# Patient Record
Sex: MALE | Race: WHITE | URBAN - METROPOLITAN AREA
[De-identification: names, ages, dates, MRNs, and addresses within clinical notes are randomized per-mention and may not be internally consistent; named-entity substitution may affect disease eponyms.]

---

## 2018-01-18 ENCOUNTER — HOSPITAL ENCOUNTER (EMERGENCY)
Facility: CLINIC | Age: 38
Discharge: HOME | End: 2018-01-18
Attending: EMERGENCY MEDICINE | Admitting: EMERGENCY MEDICINE
Payer: COMMERCIAL

## 2018-01-18 VITALS
OXYGEN SATURATION: 97 % | BODY MASS INDEX: 27.87 KG/M2 | RESPIRATION RATE: 18 BRPM | DIASTOLIC BLOOD PRESSURE: 88 MMHG | WEIGHT: 220 LBS | SYSTOLIC BLOOD PRESSURE: 147 MMHG

## 2018-01-18 DIAGNOSIS — M54.50 ACUTE MIDLINE LOW BACK PAIN WITHOUT SCIATICA: ICD-10-CM
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
## 2018-01-18 DIAGNOSIS — S16.1XXA STRAIN OF NECK MUSCLE, INITIAL ENCOUNTER: ICD-10-CM

## 2018-01-18 PROCEDURE — 99284 EMERGENCY DEPT VISIT MOD MDM: CPT | Mod: Z6 | Performed by: EMERGENCY MEDICINE

## 2018-01-18 PROCEDURE — 99282 EMERGENCY DEPT VISIT SF MDM: CPT | Performed by: EMERGENCY MEDICINE

## 2018-01-18 RX ORDER — CYCLOBENZAPRINE HCL 5 MG
5 TABLET ORAL 3 TIMES DAILY PRN
Qty: 7 TABLET | Refills: 0 | Status: SHIPPED | OUTPATIENT
Start: 2018-01-18 | End: 2018-01-26

## 2018-01-18 ASSESSMENT — ENCOUNTER SYMPTOMS
ENDOCRINE NEGATIVE: 1
PSYCHIATRIC NEGATIVE: 1
NECK STIFFNESS: 1
GASTROINTESTINAL NEGATIVE: 1
BACK PAIN: 1
ALLERGIC/IMMUNOLOGIC NEGATIVE: 1
EYES NEGATIVE: 1
NEUROLOGICAL NEGATIVE: 1
RESPIRATORY NEGATIVE: 1
NECK PAIN: 1
CONSTITUTIONAL NEGATIVE: 1
CARDIOVASCULAR NEGATIVE: 1
HEMATOLOGIC/LYMPHATIC NEGATIVE: 1

## 2018-01-18 NOTE — ED AVS SNAPSHOT
[facility] Emergency Department    [address] [facility] [facility] [address]    Phone:  [phone]    Fax:  [phone]                                       Troy Cooper   MRN: [id number]    Department:  [facility] Emergency Department   Date of Visit:  1/18/2018           After Visit Summary Signature Page     I have received my discharge instructions, and my questions have been answered. I have discussed any challenges I see with this plan with the nurse or doctor.    ..........................................................................................................................................  Patient/Patient Representative Signature      ..........................................................................................................................................  Patient Representative Print Name and Relationship to Patient    ..................................................               ................................................  Date                                            Time    ..........................................................................................................................................  Reviewed by Signature/Title    ...................................................              ..............................................  Date                                                            Time
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
## 2018-01-26 ENCOUNTER — OFFICE VISIT (OUTPATIENT)
Dept: FAMILY MEDICINE | Facility: CLINIC | Age: 38
End: 2018-01-26
Payer: COMMERCIAL

## 2018-01-26 VITALS
DIASTOLIC BLOOD PRESSURE: 72 MMHG | WEIGHT: 227 LBS | SYSTOLIC BLOOD PRESSURE: 122 MMHG | BODY MASS INDEX: 28.23 KG/M2 | RESPIRATION RATE: 18 BRPM | HEIGHT: 75 IN | HEART RATE: 80 BPM | TEMPERATURE: 98.1 F

## 2018-01-26 DIAGNOSIS — Z82.49 FAMILY HISTORY OF PREMATURE CORONARY HEART DISEASE: ICD-10-CM

## 2018-01-26 DIAGNOSIS — Z00.01 ENCOUNTER FOR ROUTINE ADULT HEALTH EXAMINATION WITH ABNORMAL FINDINGS: Primary | ICD-10-CM

## 2018-01-26 DIAGNOSIS — R07.89 ATYPICAL CHEST PAIN: ICD-10-CM

## 2018-01-26 PROCEDURE — 93000 ELECTROCARDIOGRAM COMPLETE: CPT | Performed by: NURSE PRACTITIONER

## 2018-01-26 PROCEDURE — 99385 PREV VISIT NEW AGE 18-39: CPT | Performed by: NURSE PRACTITIONER

## 2018-01-26 PROCEDURE — 99213 OFFICE O/P EST LOW 20 MIN: CPT | Mod: 25 | Performed by: NURSE PRACTITIONER

## 2018-01-26 ASSESSMENT — PAIN SCALES - GENERAL: PAINLEVEL: NO PAIN (0)

## 2018-01-26 NOTE — PATIENT INSTRUCTIONS
Schedule lab appointment for next week fasting for lab work - will update you on those results    Schedule Cardiolite stress test and I will update you on results     If having chest pain that doesn't go away in the normal time go into ER     See me back as determined with results

## 2018-01-26 NOTE — NURSING NOTE
"Chief Complaint   Patient presents with     Physical       Initial /72 (BP Location: Right arm, Patient Position: Chair, Cuff Size: Adult Large)  Pulse 80  Temp 98.1  F (36.7  C) (Tympanic)  Resp 18  Ht 6' 2.5\" (1.892 m)  Wt 227 lb (103 kg)  BMI 28.76 kg/m2 Estimated body mass index is 28.76 kg/(m^2) as calculated from the following:    Height as of this encounter: 6' 2.5\" (1.892 m).    Weight as of this encounter: 227 lb (103 kg).  Medication Reconciliation: complete    Health Maintenance that is potentially due pending provider review:  NONE    Mia Butts MA  1:49 PM 1/26/2018  .        "

## 2018-01-26 NOTE — MR AVS SNAPSHOT
After Visit Summary   1/26/2018    Troy Cooper    MRN: [id number]           Patient Information     Date Of Birth          1980        Visit Information        Provider Department      1/26/2018 1:40 PM Caity Marsh APRN CNP [facility]        Today's Diagnoses     Atypical chest pain    -  1    Encounter for routine adult health examination with abnormal findings          Care Instructions    Schedule lab appointment for next week fasting for lab work - will update you on those results    Schedule Cardiolite stress test and I will update you on results     If having chest pain that doesn't go away in the normal time go into ER     See me back as determined with results           Follow-ups after your visit        Future tests that were ordered for you today     Open Future Orders        Priority Expected Expires Ordered    Lipid panel reflex to direct LDL Fasting Routine  1/26/2019 1/26/2018    Comprehensive metabolic panel (BMP + Alb, Alk Phos, ALT, AST, Total. Bili, TP) Routine  1/26/2019 1/26/2018    CBC with platelets Routine  1/26/2019 1/26/2018    NM Exercise stress test Routine  1/26/2019 1/26/2018    TSH Routine  1/26/2019 1/26/2018            Who to contact     If you have questions or need follow up information about today's clinic visit or your schedule please contact [facility] directly at [phone].  Normal or non-critical lab and imaging results will be communicated to you by MyChart, letter or phone within 4 business days after the clinic has received the results. If you do not hear from us within 7 days, please contact the clinic through MyChart or phone. If you have a critical or abnormal lab result, we will notify you by phone as soon as possible.  Submit refill requests through [employer] or call your pharmacy and they will forward the refill request to us. Please allow 3 business days for your refill to be completed.           "Additional Information About Your Visit        MyChart Information     [employer] lets you send messages to your doctor, view your test results, renew your prescriptions, schedule appointments and more. To sign up, go to [email or web address] . Click on \"Log in\" on the left side of the screen, which will take you to the Welcome page. Then click on \"Sign up Now\" on the right side of the page.     You will be asked to enter the access code listed below, as well as some personal information. Please follow the directions to create your username and password.     Your access code is: [email or web address]  Expires: 2018  6:37 AM     Your access code will  in 90 days. If you need help or a new code, please call your [facility] clinic or [phone].        Care EveryWhere ID     This is your [facility] EveryWhere ID. This could be used by other organizations to access your [facility] medical records  [id number]        Your Vitals Were     Pulse Temperature Respirations Height BMI (Body Mass Index)       80 98.1  F (36.7  C) (Tympanic) 18 6' 2.5\" (1.892 m) 28.76 kg/m2        Blood Pressure from Last 3 Encounters:   18 122/72   18 147/88   08/07/15 129/82    Weight from Last 3 Encounters:   18 227 lb (103 kg)   18 220 lb (99.8 kg)   12 231 lb 6.4 oz (105 kg)              We Performed the Following     EKG 12-lead complete w/read - Clinics        Primary Care Provider Office Phone # Fax #    MIRA Chang [phone]       [address] [address]        Equal Access to Services     ALICIA INIGUEZ : Ruben Fleming, ofelia joel, hari marie. So [facility] [phone].    ATENCIÓN: Si habla español, tiene a ji disposición servicios gratuitos de asistencia lingüística. Llame al [phone].    We comply with applicable federal civil rights laws and [place] laws. We " do not discriminate on the basis of race, color, national origin, age, disability, sex, sexual orientation, or gender identity.            Thank you!     Thank you for choosing [facility]  for your care. Our goal is always to provide you with excellent care. Hearing back from our patients is one way we can continue to improve our services. Please take a few minutes to complete the written survey that you may receive in the mail after your visit with us. Thank you!             Your Updated Medication List - Protect others around you: Learn how to safely use, store and throw away your medicines at www.disposemymeds.org.      Notice  As of 1/26/2018  2:50 PM    You have not been prescribed any medications.

## 2018-01-26 NOTE — PROGRESS NOTES
SUBJECTIVE:   CC: Troy Cooper is an 37 year old male who presents for preventative health visit.     Physical   Annual:     Getting at least 3 servings of Calcium per day::  Yes    Bi-annual eye exam::  NO    Dental care twice a year::  Yes    Sleep apnea or symptoms of sleep apnea::  None    Diet::  Regular (no restrictions)    Frequency of exercise::  None    Taking medications regularly::  Yes    Medication side effects::  None    Additional concerns today::  YES          1.) Chest Pain, has happed 3 times. Past episode was Tuesday morning. Sharp pain in chest lasting around 5 min. Was bent over working on the floor and hit him sharp, pain would get worse with a deep breath, was going to go in, but went away - didn't have shortness of breath particularly. Not associated with any other symptoms aside from not being able to take a deep breath due to the pain, no shortness of breath, dizziness or lightheadedness  First 2 episodes happened within the last 3 months - came home from work lying down on back, not exerting self, again sharp, no associated symptoms and went away in 5 minutes  Significantly stressed over the last 3 months     No other symptoms with the episode, no radiation of pain, denies any swelling or edema  Cannot determine any other precipitators    Has passed out 2 times in the last 3 months randomly as well  First time was when dad was diagnosed with cancer in office when they got the news of diagnosis  A week later got up in middle of night go bathroom, and just blacked out, awoke by wife. Patient has not been taking care of himself in the last few months, not drinking or eating well. Is also an everyday 1 ppd smoker    Has a family history of early heart disease, paternal grandfather passed at 51 of MI   Has been more anxious and stressful with dad and work - working 24-36 hours shifts with various start times       Today's PHQ-2 Score:   PHQ-2 ( 1999 [employer]) 1/26/2018   Q1: Little interest  or pleasure in doing things 0   Q2: Feeling down, depressed or hopeless 0   PHQ-2 Score 0   Q1: Little interest or pleasure in doing things Not at all   Q2: Feeling down, depressed or hopeless Not at all   PHQ-2 Score 0       Abuse: Current or Past(Physical, Sexual or Emotional)- No  Do you feel safe in your environment - Yes    Social History   Substance Use Topics     Smoking status: Current Every Day Smoker     Packs/day: 1.00     Years: 12.00     Types: Cigarettes     Smokeless tobacco: Never Used      Comment: 1 pack per day     Alcohol use Yes      Comment: occ     Alcohol Use 1/26/2018   If you drink alcohol, do you typically have greater than 3 drinks per day OR greater than 7 drinks per week?   No   Last PSA: No results found for: PSA    Reviewed orders with patient. Reviewed health maintenance and updated orders accordingly - Yes  Labs reviewed in EPIC  BP Readings from Last 3 Encounters:   01/26/18 122/72   01/18/18 147/88   08/07/15 129/82    Wt Readings from Last 3 Encounters:   01/26/18 227 lb (103 kg)   01/18/18 220 lb (99.8 kg)   04/18/12 231 lb 6.4 oz (105 kg)                  Patient Active Problem List   Diagnosis     Tobacco abuse     CARDIOVASCULAR SCREENING; LDL GOAL LESS THAN 160     Past Surgical History:   Procedure Laterality Date     SURGICAL HISTORY OF -   2005    back surgery at [facility]       Social History   Substance Use Topics     Smoking status: Current Every Day Smoker     Packs/day: 1.00     Years: 12.00     Types: Cigarettes     Smokeless tobacco: Never Used      Comment: 1 pack per day     Alcohol use Yes      Comment: occ     Family History   Problem Relation Age of Onset     Hypertension Mother      Other Cancer Father 66     Liver cancer     Cardiovascular Paternal Grandmother      heart attack     Coronary Artery Disease Paternal Grandmother 53         No current outpatient prescriptions on file.     No Known Allergies    Reviewed and updated as needed this visit  "by clinical staff  Tobacco  Allergies  Meds  Problems  Med Hx  Surg Hx  Fam Hx  Soc Hx          Reviewed and updated as needed this visit by Provider  Tobacco  Allergies  Meds  Problems  Med Hx  Surg Hx  Fam Hx  Soc Hx         History reviewed. No pertinent past medical history.   Past Surgical History:   Procedure Laterality Date     SURGICAL HISTORY OF -   2005    back surgery at [facility]       Review of Systems  C: NEGATIVE for fever, chills, change in weight  I: NEGATIVE for worrisome rashes, moles or lesions  E: NEGATIVE for vision changes or irritation  ENT: NEGATIVE for ear, mouth and throat problems  R: NEGATIVE for significant cough or SOB  CV: POSITIVE for chest pain and family history of early CHD  GI: NEGATIVE for nausea, abdominal pain, heartburn, or change in bowel habits   male: negative for dysuria, hematuria, decreased urinary stream, erectile dysfunction, urethral discharge  M: NEGATIVE for significant arthralgias or myalgia  N: NEGATIVE for weakness, dizziness or paresthesias  P: NEGATIVE for changes in mood or affect    OBJECTIVE:   /72 (BP Location: Right arm, Patient Position: Chair, Cuff Size: Adult Large)  Pulse 80  Temp 98.1  F (36.7  C) (Tympanic)  Resp 18  Ht 6' 2.5\" (1.892 m)  Wt 227 lb (103 kg)  BMI 28.76 kg/m2    Physical Exam  GENERAL: healthy, alert and no distress  EYES: Eyes grossly normal to inspection, PERRL and conjunctivae and sclerae normal  HENT: ear canals and TM's normal, nose and mouth without ulcers or lesions  NECK: no adenopathy, no asymmetry, masses, or scars and thyroid normal to palpation  RESP: lungs clear to auscultation - no rales, rhonchi or wheezes  CV: regular rate and rhythm, normal S1 S2, no S3 or S4, no murmur, click or rub, no peripheral edema and peripheral pulses strong  ABDOMEN: soft, nontender, no hepatosplenomegaly, no masses and bowel sounds normal   (male): normal male genitalia without lesions or urethral " discharge, no hernia  MS: no gross musculoskeletal defects noted, no edema  SKIN: no suspicious lesions or rashes  NEURO: Normal strength and tone, mentation intact and speech normal  PSYCH: mentation appears normal, affect normal/bright - slightly anxious     ASSESSMENT/PLAN:   1. Encounter for routine adult health examination with abnormal findings    - Comprehensive metabolic panel (BMP + Alb, Alk Phos, ALT, AST, Total. Bili, TP); Future  - CBC with platelets; Future    2. Atypical chest pain  Patient has had 3 episodes of random sharp left sided chest pain over the last 3 months with no other associated symptoms. No precipitators identified by patient. Has a family history of early CHD. Patient also has a significant amount of stress over the last 3 months and dad recently diagnosed with liver cancer, this could be caused by the increased amount of stress and poor rest, fluid intake and proper eating. EKG normal, will get labs to rule out underlying cause and order a stress test given family history.     - EKG 12-lead complete w/read - Clinics  - Lipid panel reflex to direct LDL Fasting; Future  - Comprehensive metabolic panel (BMP + Alb, Alk Phos, ALT, AST, Total. Bili, TP); Future  - CBC with platelets; Future  - TSH; Future  - NM Lexiscan stress test; Future    3. Family history of premature coronary heart disease    - NM Lexiscan stress test; Future    COUNSELING:   Reviewed preventive health counseling, as reflected in patient instructions       Regular exercise       Healthy diet/nutrition         reports that he has been smoking Cigarettes.  He has a 12.00 pack-year smoking history. He has never used smokeless tobacco.  Tobacco Cessation Action Plan: Discussed this at length with patient, has tried Chantix in the past which didn't help. Is highly considering quitting, just not sure how he wants to go about doing this. Would like to think about this.   Estimated body mass index is 28.76 kg/(m^2) as  "calculated from the following:    Height as of this encounter: 6' 2.5\" (1.892 m).    Weight as of this encounter: 227 lb (103 kg).   Weight management plan: Discussed healthy diet and exercise guidelines and patient will follow up in 12 months in clinic to re-evaluate.    Counseling Resources:  ATP IV Guidelines  Pooled Cohorts Equation Calculator  FRAX Risk Assessment  ICSI Preventive Guidelines  Dietary Guidelines for Americans, 2010  USDA's MyPlate  ASA Prophylaxis  Lung CA Screening    MIRA Simon [facility]  "

## 2018-01-30 DIAGNOSIS — Z00.01 ENCOUNTER FOR ROUTINE ADULT HEALTH EXAMINATION WITH ABNORMAL FINDINGS: ICD-10-CM

## 2018-01-30 DIAGNOSIS — R07.89 ATYPICAL CHEST PAIN: ICD-10-CM

## 2018-01-30 LAB
ALBUMIN SERPL-MCNC: 3.9 G/DL (ref 3.4–5)
ALP SERPL-CCNC: 77 U/L (ref 40–150)
ALT SERPL W P-5'-P-CCNC: 28 U/L (ref 0–70)
ANION GAP SERPL CALCULATED.3IONS-SCNC: 4 MMOL/L (ref 3–14)
AST SERPL W P-5'-P-CCNC: 18 U/L (ref 0–45)
BILIRUB SERPL-MCNC: 0.8 MG/DL (ref 0.2–1.3)
BUN SERPL-MCNC: 16 MG/DL (ref 7–30)
CALCIUM SERPL-MCNC: 8.9 MG/DL (ref 8.5–10.1)
CHLORIDE SERPL-SCNC: 106 MMOL/L (ref 94–109)
CHOLEST SERPL-MCNC: 181 MG/DL
CO2 SERPL-SCNC: 28 MMOL/L (ref 20–32)
CREAT SERPL-MCNC: 0.93 MG/DL (ref 0.66–1.25)
ERYTHROCYTE [DISTWIDTH] IN BLOOD BY AUTOMATED COUNT: 11.9 % (ref 10–15)
GFR SERPL CREATININE-BSD FRML MDRD: >90 ML/MIN/1.7M2
GLUCOSE SERPL-MCNC: 92 MG/DL (ref 70–99)
HCT VFR BLD AUTO: 44.2 % (ref 40–53)
HDLC SERPL-MCNC: 51 MG/DL
HGB BLD-MCNC: 15.7 G/DL (ref 13.3–17.7)
LDLC SERPL CALC-MCNC: 117 MG/DL
MCH RBC QN AUTO: 31.7 PG (ref 26.5–33)
MCHC RBC AUTO-ENTMCNC: 35.5 G/DL (ref 31.5–36.5)
MCV RBC AUTO: 89 FL (ref 78–100)
NONHDLC SERPL-MCNC: 130 MG/DL
PLATELET # BLD AUTO: 254 10E9/L (ref 150–450)
POTASSIUM SERPL-SCNC: 4 MMOL/L (ref 3.4–5.3)
PROT SERPL-MCNC: 7.2 G/DL (ref 6.8–8.8)
RBC # BLD AUTO: 4.95 10E12/L (ref 4.4–5.9)
SODIUM SERPL-SCNC: 138 MMOL/L (ref 133–144)
TRIGL SERPL-MCNC: 66 MG/DL
TSH SERPL DL<=0.005 MIU/L-ACNC: 0.9 MU/L (ref 0.4–4)
WBC # BLD AUTO: 4.2 10E9/L (ref 4–11)

## 2018-01-30 PROCEDURE — 36415 COLL VENOUS BLD VENIPUNCTURE: CPT | Performed by: NURSE PRACTITIONER

## 2018-01-30 PROCEDURE — 85027 COMPLETE CBC AUTOMATED: CPT | Performed by: NURSE PRACTITIONER

## 2018-01-30 PROCEDURE — 84443 ASSAY THYROID STIM HORMONE: CPT | Performed by: NURSE PRACTITIONER

## 2018-01-30 PROCEDURE — 80061 LIPID PANEL: CPT | Performed by: NURSE PRACTITIONER

## 2018-01-30 PROCEDURE — 80053 COMPREHEN METABOLIC PANEL: CPT | Performed by: NURSE PRACTITIONER

## 2018-02-14 ENCOUNTER — HOSPITAL ENCOUNTER (OUTPATIENT)
Dept: NUCLEAR MEDICINE | Facility: CLINIC | Age: 38
Setting detail: NUCLEAR MEDICINE
Discharge: HOME | End: 2018-02-14
Attending: NURSE PRACTITIONER | Admitting: NURSE PRACTITIONER
Payer: COMMERCIAL

## 2018-02-14 DIAGNOSIS — Z82.49 FAMILY HISTORY OF PREMATURE CORONARY HEART DISEASE: ICD-10-CM

## 2018-02-14 DIAGNOSIS — R07.89 ATYPICAL CHEST PAIN: ICD-10-CM

## 2018-02-14 PROCEDURE — A9502 TC99M TETROFOSMIN: HCPCS | Performed by: NURSE PRACTITIONER

## 2018-02-14 PROCEDURE — 93018 CV STRESS TEST I&R ONLY: CPT | Performed by: INTERNAL MEDICINE

## 2018-02-14 PROCEDURE — 93017 CV STRESS TEST TRACING ONLY: CPT

## 2018-02-14 PROCEDURE — 34300033 ZZH RX 343: Performed by: NURSE PRACTITIONER

## 2018-02-14 PROCEDURE — 93016 CV STRESS TEST SUPVJ ONLY: CPT | Performed by: FAMILY MEDICINE

## 2018-02-14 PROCEDURE — 78452 HT MUSCLE IMAGE SPECT MULT: CPT | Mod: 26 | Performed by: INTERNAL MEDICINE

## 2018-02-14 PROCEDURE — 78452 HT MUSCLE IMAGE SPECT MULT: CPT

## 2018-02-14 RX ADMIN — TETROFOSMIN 10.9 MCI.: 1.38 INJECTION, POWDER, LYOPHILIZED, FOR SOLUTION INTRAVENOUS at 07:45

## 2018-02-14 RX ADMIN — TETROFOSMIN 31.8 MCI.: 1.38 INJECTION, POWDER, LYOPHILIZED, FOR SOLUTION INTRAVENOUS at 09:25

## 2018-02-19 DIAGNOSIS — R07.89 ATYPICAL CHEST PAIN: Primary | ICD-10-CM

## 2019-03-27 ENCOUNTER — OFFICE VISIT (OUTPATIENT)
Dept: FAMILY MEDICINE | Facility: CLINIC | Age: 39
End: 2019-03-27
Payer: COMMERCIAL

## 2019-03-27 VITALS
HEIGHT: 75 IN | BODY MASS INDEX: 28.6 KG/M2 | TEMPERATURE: 97.6 F | RESPIRATION RATE: 20 BRPM | HEART RATE: 81 BPM | SYSTOLIC BLOOD PRESSURE: 116 MMHG | DIASTOLIC BLOOD PRESSURE: 76 MMHG | WEIGHT: 230 LBS | OXYGEN SATURATION: 97 %

## 2019-03-27 DIAGNOSIS — L08.9 FINGER INFECTION: Primary | ICD-10-CM

## 2019-03-27 PROCEDURE — 99213 OFFICE O/P EST LOW 20 MIN: CPT | Performed by: NURSE PRACTITIONER

## 2019-03-27 RX ORDER — CEPHALEXIN 500 MG/1
500 CAPSULE ORAL 2 TIMES DAILY
Qty: 20 CAPSULE | Refills: 0 | Status: SHIPPED | OUTPATIENT
Start: 2019-03-27 | End: 2019-04-06

## 2019-03-27 ASSESSMENT — MIFFLIN-ST. JEOR: SCORE: 2038.9

## 2019-03-27 NOTE — PATIENT INSTRUCTIONS
Tylenol and/or Ibuprofen for comfort.  If your symptoms worsen or do not resolve follow up with your primary care provider in 1 week and sooner if needed.      Take antibiotics as directed  Indications for emergent return to emergency department discussed with patient, who verbalized good understanding and agreement.  Patient understands the limitations of today's evaluation.           Patient Education     Infected Laceration, Not Stitched  A laceration is a cut through the skin. The cut has become infected. Because of the infection, and the amount of time that has passed since injury, the wound cannot be closed. It will heal best if left open and cleaned daily. It will seal over by growing new tissue from the sides and the bottom of the wound. Antibiotics may be prescribed. You will probably have a scar after it has healed.   Oral antibiotic medicine may be prescribed to treat the infection.  Home care    If antibiotics have been prescribed, take them exactly as directed. Don't t stop taking them until they are gone or you are told to stop, even if you feel better.     Follow the healthcare provider s instructions on how to care for the cut.    Unless otherwise instructed, change the bandage twice a day for the first few days, until the drainage stops. Then change it once a day. Change the bandage if it becomes wet, stained with wound fluid, or dirty.    Clean the wound daily:  ? After removing the bandage, gently wash the area with soap and water. Use a wet cotton swab to loosen and remove any blood or crust that forms.  ? After cleaning, apply a thin layer of over-the-counter antibiotic ointment if advised. Reapply a fresh bandage.    Follow the healthcare provider's instructions for keeping the wound dry. You may be given restrictions on showering or tub baths.    If the bandage gets wet, remove it. Gently pat the wound dry with a clean cloth, then replace the wet bandage with a dry one.    Don't scratch, rub,  or pick at the area.    Wash your hands with soap and warm water before and after cleaning the wound or changing the bandage.  Follow-up care  Follow up with your healthcare provider, or as advised. It is important to follow up to make sure the infection is getting better.  When to seek medical advice  Call your healthcare provider right away if any of these occur:    Symptoms don't begin to improve or get worse    Red streaks spread from the wound    Increase in pus coming from the wound    Increase in pain    Fever of 100.4 F (38 C) or higher, or as directed by your healthcare provider  Date Last Reviewed: 7/1/2017 2000-2018 [employer]. [address] [place] [address], [place] [address]. All rights reserved. This information is not intended as a substitute for professional medical care. Always follow your healthcare professional's instructions.

## 2019-03-27 NOTE — PROGRESS NOTES
SUBJECTIVE:   Troy Cooper is a 38 year old male who presents to clinic today for the following health issues:      Infection       Duration: 3 weeks     Description (location/character/radiation): Infection- right middle finger     Intensity:  moderate    Accompanying signs and symptoms: swelling, redness, tried popping it, no discharge     History (similar episodes/previous evaluation): None    Precipitating or alleviating factors: None    Therapies tried and outcome: None           Problem list and histories reviewed & adjusted, as indicated.  Additional history: as documented    Patient Active Problem List   Diagnosis     Tobacco abuse     CARDIOVASCULAR SCREENING; LDL GOAL LESS THAN 160     Past Surgical History:   Procedure Laterality Date     SURGICAL HISTORY OF -   2005    back surgery at [facility]       Social History     Tobacco Use     Smoking status: Current Every Day Smoker     Packs/day: 1.00     Years: 12.00     Pack years: 12.00     Types: Cigarettes     Smokeless tobacco: Never Used     Tobacco comment: 1 pack per day   Substance Use Topics     Alcohol use: Yes     Comment: occ     Family History   Problem Relation Age of Onset     Hypertension Mother      Other Cancer Father 66        Liver cancer     Cardiovascular Paternal Grandmother         heart attack     Coronary Artery Disease Paternal Grandmother 53         Current Outpatient Medications   Medication Sig Dispense Refill     cephALEXin (KEFLEX) 500 MG capsule Take 1 capsule (500 mg) by mouth 2 times daily for 10 days 20 capsule 0     No Known Allergies  Labs reviewed in EPIC    Reviewed and updated as needed this visit by clinical staff  Tobacco  Allergies  Meds  Problems  Med Hx  Surg Hx  Fam Hx  Soc Hx        Reviewed and updated as needed this visit by Provider  Tobacco  Allergies  Meds  Problems  Med Hx  Surg Hx  Fam Hx         ROS:  Constitutional, HEENT, cardiovascular, pulmonary, GI, , musculoskeletal,  "neuro, skin, endocrine and psych systems are negative, except as otherwise noted.    OBJECTIVE:     /76   Pulse 81   Temp 97.6  F (36.4  C) (Tympanic)   Resp 20   Ht 1.905 m (6' 3\")   Wt 104.3 kg (230 lb)   SpO2 97%   BMI 28.75 kg/m    Body mass index is 28.75 kg/m .   GENERAL: healthy, alert and no distress, nontoxic in appearance  EYES: Eyes grossly normal to inspection, PERRL and conjunctivae and sclerae normal  HENT: normocephalic  NECK: supple with full ROM  ABDOMEN: soft, nontender, no hepatosplenomegaly, no masses and bowel sounds normal  MS: no gross musculoskeletal defects noted, mild redness and swelling of right middle finger just distal to knuckle.    Diagnostic Test Results:  No results found for this or any previous visit (from the past 24 hour(s)).    ASSESSMENT/PLAN:     Problem List Items Addressed This Visit     None      Visit Diagnoses     Finger infection    -  Primary    Relevant Medications    cephALEXin (KEFLEX) 500 MG capsule               Patient Instructions   Tylenol and/or Ibuprofen for comfort.  If your symptoms worsen or do not resolve follow up with your primary care provider in 1 week and sooner if needed.      Take antibiotics as directed  Indications for emergent return to emergency department discussed with patient, who verbalized good understanding and agreement.  Patient understands the limitations of today's evaluation.           Patient Education     Infected Laceration, Not Stitched  A laceration is a cut through the skin. The cut has become infected. Because of the infection, and the amount of time that has passed since injury, the wound cannot be closed. It will heal best if left open and cleaned daily. It will seal over by growing new tissue from the sides and the bottom of the wound. Antibiotics may be prescribed. You will probably have a scar after it has healed.   Oral antibiotic medicine may be prescribed to treat the infection.  Home care    If " antibiotics have been prescribed, take them exactly as directed. Don't t stop taking them until they are gone or you are told to stop, even if you feel better.     Follow the healthcare provider s instructions on how to care for the cut.    Unless otherwise instructed, change the bandage twice a day for the first few days, until the drainage stops. Then change it once a day. Change the bandage if it becomes wet, stained with wound fluid, or dirty.    Clean the wound daily:  ? After removing the bandage, gently wash the area with soap and water. Use a wet cotton swab to loosen and remove any blood or crust that forms.  ? After cleaning, apply a thin layer of over-the-counter antibiotic ointment if advised. Reapply a fresh bandage.    Follow the healthcare provider's instructions for keeping the wound dry. You may be given restrictions on showering or tub baths.    If the bandage gets wet, remove it. Gently pat the wound dry with a clean cloth, then replace the wet bandage with a dry one.    Don't scratch, rub, or pick at the area.    Wash your hands with soap and warm water before and after cleaning the wound or changing the bandage.  Follow-up care  Follow up with your healthcare provider, or as advised. It is important to follow up to make sure the infection is getting better.  When to seek medical advice  Call your healthcare provider right away if any of these occur:    Symptoms don't begin to improve or get worse    Red streaks spread from the wound    Increase in pus coming from the wound    Increase in pain    Fever of 100.4 F (38 C) or higher, or as directed by your healthcare provider  Date Last Reviewed: 7/1/2017 2000-2018 [employer]. [address] [place] [id number]. All rights reserved. This information is not intended as a substitute for professional medical care. Always follow your healthcare professional's instructions.               MIRA Morales [facility]  [facility]